# Patient Record
Sex: MALE | Race: WHITE
[De-identification: names, ages, dates, MRNs, and addresses within clinical notes are randomized per-mention and may not be internally consistent; named-entity substitution may affect disease eponyms.]

---

## 2020-08-05 ENCOUNTER — HOSPITAL ENCOUNTER (EMERGENCY)
Dept: HOSPITAL 62 - ER | Age: 50
Discharge: HOME | End: 2020-08-05
Payer: SELF-PAY

## 2020-08-05 VITALS — DIASTOLIC BLOOD PRESSURE: 94 MMHG | SYSTOLIC BLOOD PRESSURE: 143 MMHG

## 2020-08-05 DIAGNOSIS — I10: ICD-10-CM

## 2020-08-05 DIAGNOSIS — F17.200: ICD-10-CM

## 2020-08-05 DIAGNOSIS — M54.42: Primary | ICD-10-CM

## 2020-08-05 DIAGNOSIS — F10.10: ICD-10-CM

## 2020-08-05 DIAGNOSIS — R00.0: ICD-10-CM

## 2020-08-05 LAB
ADD MANUAL DIFF: NO
ALBUMIN SERPL-MCNC: 4.1 G/DL (ref 3.5–5)
ALP SERPL-CCNC: 54 U/L (ref 38–126)
ANION GAP SERPL CALC-SCNC: 12 MMOL/L (ref 5–19)
AST SERPL-CCNC: 167 U/L (ref 17–59)
BASOPHILS # BLD AUTO: 0 10^3/UL (ref 0–0.2)
BASOPHILS NFR BLD AUTO: 0.5 % (ref 0–2)
BILIRUB DIRECT SERPL-MCNC: 0 MG/DL (ref 0–0.4)
BILIRUB SERPL-MCNC: 0.5 MG/DL (ref 0.2–1.3)
BUN SERPL-MCNC: 3 MG/DL (ref 7–20)
CALCIUM: 8.6 MG/DL (ref 8.4–10.2)
CHLORIDE SERPL-SCNC: 100 MMOL/L (ref 98–107)
CO2 SERPL-SCNC: 23 MMOL/L (ref 22–30)
EOSINOPHIL # BLD AUTO: 0 10^3/UL (ref 0–0.6)
EOSINOPHIL NFR BLD AUTO: 0.9 % (ref 0–6)
ERYTHROCYTE [DISTWIDTH] IN BLOOD BY AUTOMATED COUNT: 13.8 % (ref 11.5–14)
GLUCOSE SERPL-MCNC: 127 MG/DL (ref 75–110)
HCT VFR BLD CALC: 43.8 % (ref 37.9–51)
HGB BLD-MCNC: 15.2 G/DL (ref 13.5–17)
LYMPHOCYTES # BLD AUTO: 0.7 10^3/UL (ref 0.5–4.7)
LYMPHOCYTES NFR BLD AUTO: 18.7 % (ref 13–45)
MCH RBC QN AUTO: 36.4 PG (ref 27–33.4)
MCHC RBC AUTO-ENTMCNC: 34.7 G/DL (ref 32–36)
MCV RBC AUTO: 105 FL (ref 80–97)
MONOCYTES # BLD AUTO: 0.5 10^3/UL (ref 0.1–1.4)
MONOCYTES NFR BLD AUTO: 12.9 % (ref 3–13)
NEUTROPHILS # BLD AUTO: 2.6 10^3/UL (ref 1.7–8.2)
NEUTS SEG NFR BLD AUTO: 67 % (ref 42–78)
PLATELET # BLD: 144 10^3/UL (ref 150–450)
POTASSIUM SERPL-SCNC: 3.9 MMOL/L (ref 3.6–5)
PROT SERPL-MCNC: 7.1 G/DL (ref 6.3–8.2)
RBC # BLD AUTO: 4.17 10^6/UL (ref 4.35–5.55)
TOTAL CELLS COUNTED % (AUTO): 100 %
WBC # BLD AUTO: 3.9 10^3/UL (ref 4–10.5)

## 2020-08-05 PROCEDURE — 80053 COMPREHEN METABOLIC PANEL: CPT

## 2020-08-05 PROCEDURE — 96360 HYDRATION IV INFUSION INIT: CPT

## 2020-08-05 PROCEDURE — 99283 EMERGENCY DEPT VISIT LOW MDM: CPT

## 2020-08-05 PROCEDURE — 83690 ASSAY OF LIPASE: CPT

## 2020-08-05 PROCEDURE — 85025 COMPLETE CBC W/AUTO DIFF WBC: CPT

## 2020-08-05 PROCEDURE — 36415 COLL VENOUS BLD VENIPUNCTURE: CPT

## 2020-08-05 NOTE — ER DOCUMENT REPORT
ED General





- General


Chief Complaint: Low Back Pain


Stated Complaint: BODY ACHES


Time Seen by Provider: 08/05/20 08:37





- HPI


Notes: 





Patient is a 49-year-old male who presents to the emergency department for 

evaluation of pain in his left leg as well as numbness.  He states that over the

last several months he has had intermittent numbness in his left leg.  He states

it comes with some shooting pain as well.  He states most recently he is noticed

it on the inner aspect of his foot.  He denies any bowel or bladder incontinence

, no saddle anesthesia, no focal numbness or weakness.  At this point he states 

he is actually not having any numbness, not having any pain.  He admits to 

drinking heavily.  He states that since the COVID-19 crisis started, he has been

ingesting approximately 20 beers daily.  He denies any suicidal or homicidal 

ideations.  He denies any visual or auditory hallucinations.  He does state that

he is experienced shaking and withdrawal symptoms before.  He considered 

inpatient treatment, but states that at this time it is not possible given his 

profession.  He states he does have help at home.  He states a primary care 

provider had written him some Librium in the past, and he tolerated that well, 

was hopeful to receive something like that again.





- Related Data


Allergies/Adverse Reactions: 


                                        





No Known Allergies Allergy (Unverified 08/05/20 08:08)


   








Home Medications: lisinopril -admits only taking intermittently





Past Medical History





- General


Information source: Patient





- Social History


Smoking Status: Current Every Day Smoker


Frequency of alcohol use: Heavy


Family History: Reviewed & Not Pertinent


Patient has suicidal ideation: No


Patient has homicidal ideation: No





- Past Medical History


Cardiac Medical History: Reports: Hx Hypertension


Past Surgical History: Reports: Hx Orthopedic Surgery - Ganglion cyst, left 

wrist





Review of Systems





- Review of Systems


Musculoskeletal: See HPI


Neurological/Psychological: See HPI


-: Yes All other systems reviewed and negative





Physical Exam





- Vital signs


Vitals: 


                                        











Temp Pulse Resp BP Pulse Ox


 


 97.5 F   122 H  19   143/94 H  100 


 


 08/05/20 08:06  08/05/20 08:06  08/05/20 08:06  08/05/20 08:06  08/05/20 08:06














- Notes


Notes: 





This is a pleasant 49-year-old male who appears stated age, no acute distress.  

Vital signs reviewed, please refer to chart. Head is normocephalic, atraumatic. 

Pupils equal round, reactive to light.  Neck is supple without meningismus.  

Heart is regular rate and rhythm.  Lungs are clear to auscultation bilaterally. 

Abdomen is soft, nontender, normoactive bowel sounds throughout.  Examination of

the spine yields no obvious deformity.  No midline tenderness or step-off.  No 

paraspinal musculature tenderness is appreciated.  He has no piriformis te

nderness to palpation.  Negative straight leg raise.  Strength is plus 5 out of 

5 bilateral lower extremities.  Patellar and Achilles reflexes are +2 and +1 

respectively.  Sensation is intact in bilateral lower extremities.





Course





- Re-evaluation


Re-evalutation: 





08/05/20 10:07


Patient presents to the emergency department for evaluation.  Laboratory 

investigations were obtained, as the patient is tachycardic upon arrival.  He is

given IV fluids.  The patient again expresses the fact that he is not interested

in receiving inpatient alcohol treatment.  He is awake and alert, cooperative, 

certainly has capacity.  I will write him a small amount of Librium.  He states 

he lives with his girlfriend, does have resources and does not live alone.  In 

regards to his symptoms, they seem most consistent with sciatica, despite the 

fact that the patient is not currently having symptoms.  I will write him a 

prescription for a Medrol Dosepak.  He is also encouraged to take his lisinopril

more regularly, and follow-up closely with his primary care doctor.  He is 

amenable to this plan.  I will give him outpatient referral for substance abuse 

issues.  He is to return to the emergency department with worsening or new 

concerning symptoms of any sort.


08/05/20 10:13








- Vital Signs


Vital signs: 


                                        











Temp Pulse Resp BP Pulse Ox


 


 97.5 F   122 H  19   143/94 H  100 


 


 08/05/20 08:06  08/05/20 08:06  08/05/20 08:06  08/05/20 08:06  08/05/20 08:06














- Laboratory


Result Diagrams: 


                                 08/05/20 09:33





                                 08/05/20 09:33


Laboratory results interpreted by me: 


                                        











  08/05/20 08/05/20





  09:33 09:33


 


WBC  3.9 L 


 


RBC  4.17 L 


 


MCV  105 H 


 


MCH  36.4 H 


 


Plt Count  144 L 


 


Sodium   135.4 L


 


BUN   3 L


 


Glucose   127 H


 


AST   167 H


 


ALT   93 H














Discharge





- Discharge


Clinical Impression: 


 Alcohol abuse





Sciatica


Qualifiers:


 Laterality: left Qualified Code(s): M54.32 - Sciatica, left side





Condition: Stable


Disposition: HOME, SELF-CARE


Instructions:  Sciatica ([facility]), Chronic Alcoholism ([facility]), Alcohol Withdrawl ([facility])


Additional Instructions: 


Your left leg pain and numbness is likely secondary to sciatica.  Please take 

the Medrol Dosepak as prescribed until it is gone.  If you develop difficulty 

urinating or moving your bowels, leaking of urine or fecal material, numbness in

the genital area, or focal weakness, please return to the ER for further 

evaluation.  You have stated you are not interested in inpatient rehab for your 

alcohol dependence at this time.  Please take Librium as needed for significant 

withdrawal symptoms.  You have also been given information on detox and 

substance abuse services in the region.  If you develop confusion, increased 

agitation, vomiting, or any other new or concerning symptoms, please return 

immediately to the emergency department for evaluation.